# Patient Record
Sex: MALE | Race: ASIAN | HISPANIC OR LATINO | Employment: FULL TIME | URBAN - NONMETROPOLITAN AREA
[De-identification: names, ages, dates, MRNs, and addresses within clinical notes are randomized per-mention and may not be internally consistent; named-entity substitution may affect disease eponyms.]

---

## 2022-04-26 ENCOUNTER — OFFICE VISIT (OUTPATIENT)
Dept: FAMILY MEDICINE | Facility: CLINIC | Age: 27
End: 2022-04-26

## 2022-04-26 VITALS
WEIGHT: 155 LBS | OXYGEN SATURATION: 100 % | RESPIRATION RATE: 18 BRPM | HEART RATE: 61 BPM | DIASTOLIC BLOOD PRESSURE: 61 MMHG | HEIGHT: 65 IN | SYSTOLIC BLOOD PRESSURE: 103 MMHG | BODY MASS INDEX: 25.83 KG/M2 | TEMPERATURE: 98 F

## 2022-04-26 DIAGNOSIS — M54.9 BACK PAIN, UNSPECIFIED BACK LOCATION, UNSPECIFIED BACK PAIN LATERALITY, UNSPECIFIED CHRONICITY: Primary | ICD-10-CM

## 2022-04-26 DIAGNOSIS — K62.5 RECTAL BLEEDING: ICD-10-CM

## 2022-04-26 LAB
BASOPHILS # BLD AUTO: 0.06 K/UL (ref 0–0.2)
BASOPHILS NFR BLD AUTO: 0.7 % (ref 0–1)
BILIRUB SERPL-MCNC: NORMAL MG/DL
BLOOD URINE, POC: NORMAL
COLOR, POC UA: YELLOW
CRP SERPL-MCNC: 0.83 MG/DL (ref 0–0.8)
DIFFERENTIAL METHOD BLD: ABNORMAL
EOSINOPHIL # BLD AUTO: 0.18 K/UL (ref 0–0.5)
EOSINOPHIL NFR BLD AUTO: 2.2 % (ref 1–4)
ERYTHROCYTE [DISTWIDTH] IN BLOOD BY AUTOMATED COUNT: 13 % (ref 11.5–14.5)
ERYTHROCYTE [SEDIMENTATION RATE] IN BLOOD BY WESTERGREN METHOD: 16 MM/HR (ref 0–15)
GLUCOSE UR QL STRIP: NORMAL
HCT VFR BLD AUTO: 31.8 % (ref 40–54)
HGB BLD-MCNC: 11 G/DL (ref 13.5–18)
IMM GRANULOCYTES # BLD AUTO: 0.02 K/UL (ref 0–0.04)
IMM GRANULOCYTES NFR BLD: 0.2 % (ref 0–0.4)
KETONES UR QL STRIP: NORMAL
LEUKOCYTE ESTERASE URINE, POC: NORMAL
LYMPHOCYTES # BLD AUTO: 1.76 K/UL (ref 1–4.8)
LYMPHOCYTES NFR BLD AUTO: 21.9 % (ref 27–41)
MCH RBC QN AUTO: 31 PG (ref 27–31)
MCHC RBC AUTO-ENTMCNC: 34.6 G/DL (ref 32–36)
MCV RBC AUTO: 89.6 FL (ref 80–96)
MONOCYTES # BLD AUTO: 0.69 K/UL (ref 0–0.8)
MONOCYTES NFR BLD AUTO: 8.6 % (ref 2–6)
MPC BLD CALC-MCNC: 9.9 FL (ref 9.4–12.4)
NEUTROPHILS # BLD AUTO: 5.33 K/UL (ref 1.8–7.7)
NEUTROPHILS NFR BLD AUTO: 66.4 % (ref 53–65)
NITRITE, POC UA: NORMAL
NRBC # BLD AUTO: 0 X10E3/UL
NRBC, AUTO (.00): 0 %
PH, POC UA: 6
PLATELET # BLD AUTO: 285 K/UL (ref 150–400)
PROTEIN, POC: NORMAL
RBC # BLD AUTO: 3.55 M/UL (ref 4.6–6.2)
SPECIFIC GRAVITY, POC UA: >1.03
UROBILINOGEN, POC UA: 0.2
WBC # BLD AUTO: 8.04 K/UL (ref 4.5–11)

## 2022-04-26 PROCEDURE — 86140 C-REACTIVE PROTEIN: ICD-10-PCS | Mod: ,,, | Performed by: CLINICAL MEDICAL LABORATORY

## 2022-04-26 PROCEDURE — 99051 PR MEDICAL SERVICES, EVE/WKEND/HOLIDAY: ICD-10-PCS | Mod: ,,, | Performed by: NURSE PRACTITIONER

## 2022-04-26 PROCEDURE — 85025 CBC WITH DIFFERENTIAL: ICD-10-PCS | Mod: ,,, | Performed by: CLINICAL MEDICAL LABORATORY

## 2022-04-26 PROCEDURE — 99051 MED SERV EVE/WKEND/HOLIDAY: CPT | Mod: ,,, | Performed by: NURSE PRACTITIONER

## 2022-04-26 PROCEDURE — 99203 OFFICE O/P NEW LOW 30 MIN: CPT | Mod: ,,, | Performed by: NURSE PRACTITIONER

## 2022-04-26 PROCEDURE — 86140 C-REACTIVE PROTEIN: CPT | Mod: ,,, | Performed by: CLINICAL MEDICAL LABORATORY

## 2022-04-26 PROCEDURE — 85651 SEDIMENTATION RATE, AUTOMATED: ICD-10-PCS | Mod: ,,, | Performed by: CLINICAL MEDICAL LABORATORY

## 2022-04-26 PROCEDURE — 99203 PR OFFICE/OUTPT VISIT, NEW, LEVL III, 30-44 MIN: ICD-10-PCS | Mod: ,,, | Performed by: NURSE PRACTITIONER

## 2022-04-26 PROCEDURE — 85651 RBC SED RATE NONAUTOMATED: CPT | Mod: ,,, | Performed by: CLINICAL MEDICAL LABORATORY

## 2022-04-26 PROCEDURE — 81003 URINALYSIS AUTO W/O SCOPE: CPT | Mod: QW,,, | Performed by: NURSE PRACTITIONER

## 2022-04-26 PROCEDURE — 81003 POCT URINALYSIS W/O SCOPE: ICD-10-PCS | Mod: QW,,, | Performed by: NURSE PRACTITIONER

## 2022-04-26 PROCEDURE — 85025 COMPLETE CBC W/AUTO DIFF WBC: CPT | Mod: ,,, | Performed by: CLINICAL MEDICAL LABORATORY

## 2022-04-26 RX ORDER — CYCLOBENZAPRINE HCL 10 MG
10 TABLET ORAL 3 TIMES DAILY PRN
COMMUNITY

## 2022-04-26 RX ORDER — METHOCARBAMOL 750 MG/1
500 TABLET, FILM COATED ORAL 4 TIMES DAILY
COMMUNITY

## 2022-04-26 NOTE — PROGRESS NOTES
"   [facility]   [facility]      PATIENT NAME: Martha Fisher  : 1995  DATE: 22  MRN: [id number]      Patient PCP Information     Provider PCP Type    Primary Doctor No General          Reason for Visit / Chief Complaint: Back Pain (Patient complains of back pain and having blood in urine and stool. Room 5)       History of Present Illness / Problem Focused Workflow     Martha Fisher presents to the clinic with Back Pain (Patient complains of back pain and having blood in urine and stool. Room 5)     HPI   Patient has 6 mo hx of low back pain. Patient also report blood in stools. Bright red blood.   Mild abdominal pain. No nvd. No reported constipation or straining. In last 3 mo 4 lb weight loss.   No family hx of chrons or UC. No family hx of colon cancer.   Patient has been seen at outside clinics previously where he was given muscle relaxers x 2 for back pain and "suppository" for reported hemorrhoid.   Rectal bleeding continued despite use of suppositories    Review of Systems     Review of Systems   Constitutional: Negative for activity change, appetite change, chills, diaphoresis, fatigue, fever and unexpected weight change.   HENT: Negative for congestion, ear pain, facial swelling, hearing loss, nosebleeds and sore throat.    Respiratory: Negative for apnea, cough, shortness of breath and wheezing.    Cardiovascular: Negative for chest pain, palpitations and leg swelling.   Gastrointestinal: Positive for abdominal pain and blood in stool. Negative for abdominal distention, constipation, diarrhea and nausea.   Endocrine: Negative for cold intolerance, heat intolerance, polydipsia, polyphagia and polyuria.   Genitourinary: Negative for decreased urine volume, difficulty urinating, dysuria, flank pain, frequency, hematuria and urgency.   Musculoskeletal: Positive for back pain. Negative for arthralgias, joint swelling and myalgias.   Skin: Negative for color change and rash. " "  Neurological: Negative for dizziness, tremors, seizures, syncope, facial asymmetry, speech difficulty, weakness, light-headedness, numbness and headaches.   Hematological: Negative for adenopathy. Does not bruise/bleed easily.   Psychiatric/Behavioral: Negative for behavioral problems and confusion.       Medical / Social / Family History   History reviewed. No pertinent past medical history.    History reviewed. No pertinent surgical history.    Social History    reports that he has never smoked. He has never used smokeless tobacco. He reports that he does not drink alcohol and does not use drugs.    Family History  's family history includes Diabetes in his paternal grandmother.    Medications and Allergies     Medications  Outpatient Medications Marked as Taking for the 4/26/22 encounter (Office Visit) with CAMPOS Park   Medication Sig Dispense Refill    cyclobenzaprine (FLEXERIL) 10 MG tablet Take 10 mg by mouth 3 (three) times daily as needed for Muscle spasms.      methocarbamoL (ROBAXIN) 750 MG Tab Take 500 mg by mouth 4 (four) times daily.         Allergies  Review of patient's allergies indicates:  No Known Allergies    Physical Examination     Vitals:    04/26/22 1555   BP: 103/61   Pulse: 61   Resp: 18   Temp: 98 °F (36.7 °C)   SpO2: 100%   Weight: 70.3 kg (155 lb)   Height: 5' 5" (1.651 m)       Physical Exam  Vitals reviewed.   Constitutional:       Appearance: Normal appearance.   HENT:      Head: Normocephalic.      Right Ear: External ear normal.      Left Ear: External ear normal.      Nose: Nose normal.      Mouth/Throat:      Mouth: Mucous membranes are moist.   Eyes:      Extraocular Movements: Extraocular movements intact.   Cardiovascular:      Rate and Rhythm: Normal rate and regular rhythm.      Pulses: Normal pulses.      Heart sounds: Normal heart sounds.   Pulmonary:      Effort: Pulmonary effort is normal.      Breath sounds: Normal breath sounds.   Abdominal:      " General: There is no distension.      Palpations: There is no mass.      Tenderness: There is abdominal tenderness. There is no right CVA tenderness, left CVA tenderness, guarding or rebound.      Hernia: No hernia is present.      Comments: Bilateral upper and lower lower  Quad tenderness   Genitourinary:     Rectum: Guaiac result negative. No mass, tenderness, anal fissure or external hemorrhoid. Normal anal tone.   Musculoskeletal:         General: Normal range of motion.      Cervical back: Normal range of motion.   Skin:     General: Skin is warm and dry.      Capillary Refill: Capillary refill takes less than 2 seconds.   Neurological:      General: No focal deficit present.      Mental Status: He is alert and oriented to person, place, and time.   Psychiatric:         Mood and Affect: Mood normal.         Behavior: Behavior normal.         Thought Content: Thought content normal.         Judgment: Judgment normal.           Office Visit on 04/26/2022   Component Date Value Ref Range Status    Color, UA 04/26/2022 Yellow   Final    Spec Grav UA 04/26/2022 >1.030   Final    pH, UA 04/26/2022 6.0   Final    WBC, UA 04/26/2022 neg   Final    Nitrite, UA 04/26/2022 neg   Final    Protein, POC 04/26/2022 neg   Final    Glucose, UA 04/26/2022 neg   Final    Ketones, UA 04/26/2022 neg   Final    Bilirubin, POC 04/26/2022 neg   Final    Urobilinogen, UA 04/26/2022 0.2   Final    Blood, UA 04/26/2022 tr   Final    ESR Westergren 04/26/2022 16 (A) 0 - 15 mm/Hr Final    CRP 04/26/2022 0.83 (A) 0.00 - 0.80 mg/dL Final    WBC 04/26/2022 8.04  4.50 - 11.00 K/uL Final    RBC 04/26/2022 3.55 (A) 4.60 - 6.20 M/uL Final    Hemoglobin 04/26/2022 11.0 (A) 13.5 - 18.0 g/dL Final    Hematocrit 04/26/2022 31.8 (A) 40.0 - 54.0 % Final    MCV 04/26/2022 89.6  80.0 - 96.0 fL Final    MCH 04/26/2022 31.0  27.0 - 31.0 pg Final    MCHC 04/26/2022 34.6  32.0 - 36.0 g/dL Final    RDW 04/26/2022 13.0  11.5 - 14.5 % Final     Platelet Count 04/26/2022 285  150 - 400 K/uL Final    MPV 04/26/2022 9.9  9.4 - 12.4 fL Final    Neutrophils % 04/26/2022 66.4 (A) 53.0 - 65.0 % Final    Lymphocytes % 04/26/2022 21.9 (A) 27.0 - 41.0 % Final    Monocytes % 04/26/2022 8.6 (A) 2.0 - 6.0 % Final    Eosinophils % 04/26/2022 2.2  1.0 - 4.0 % Final    Basophils % 04/26/2022 0.7  0.0 - 1.0 % Final    Immature Granulocytes % 04/26/2022 0.2  0.0 - 0.4 % Final    nRBC, Auto 04/26/2022 0.0  <=0.0 % Final    Neutrophils, Abs 04/26/2022 5.33  1.80 - 7.70 K/uL Final    Lymphocytes, Absolute 04/26/2022 1.76  1.00 - 4.80 K/uL Final    Monocytes, Absolute 04/26/2022 0.69  0.00 - 0.80 K/uL Final    Eosinophils, Absolute 04/26/2022 0.18  0.00 - 0.50 K/uL Final    Basophils, Absolute 04/26/2022 0.06  0.00 - 0.20 K/uL Final    Immature Granulocytes, Absolute 04/26/2022 0.02  0.00 - 0.04 K/uL Final    nRBC, Absolute 04/26/2022 0.00  <=0.00 x10e3/uL Final    Diff Type 04/26/2022 Auto   Final             Assessment and Plan (including Health Maintenance)   :    Plan:   Back pain, unspecified back location, unspecified back pain laterality, unspecified chronicity  -     POCT URINALYSIS W/O SCOPE negative few rbc    Rectal bleeding  -     CBC Auto Differential; Future; Expected date: 04/26/2022  -     Sedimentation Rate; Future; Expected date: 04/26/2022  -     C-Reactive Protein; Future; Expected date: 04/26/2022  -     Calprotectin, Stool; Future; Expected date: 04/26/2022  -     Ova and Parasite Exam; Future; Expected date: 04/26/2022  -     Enteric Pathogen Panel; Future; Expected date: 04/26/2022  -     Occult blood x 1, stool; Future; Expected date: 04/26/2022  RTC in 2 weeks to review results. May need gi referral depending on results and if bleeding continues      There are no Patient Instructions on file for this visit.       Health Maintenance Due   Topic Date Due    Hepatitis C Screening  Never done    Lipid Panel  Never done    COVID-19  Vaccine (1) Never done    HPV Vaccines (1 - Male 2-dose series) Never done    HIV Screening  Never done    TETANUS VACCINE  Never done    Influenza Vaccine (1) Never done         There is no immunization history on file for this patient.     Problem List Items Addressed This Visit    None     Visit Diagnoses     Back pain, unspecified back location, unspecified back pain laterality, unspecified chronicity    -  Primary    Relevant Orders    POCT URINALYSIS W/O SCOPE (Completed)    Rectal bleeding        Relevant Orders    CBC Auto Differential (Completed)    Sedimentation Rate (Completed)    C-Reactive Protein (Completed)    Calprotectin, Stool    Ova and Parasite Exam    Enteric Pathogen Panel    Occult blood x 1, stool          The patient has no Health Maintenance topics of status Not Due    Future Appointments   Date Time Provider Department Center   5/10/2022  4:30 PM [facility]            Signature:  C[facility] Park [facility]     Date of encounter: 4/26/22

## 2022-04-27 LAB — OCCULT BLOOD: NEGATIVE

## 2022-04-27 PROCEDURE — 87177 OVA AND PARASITE EXAM: ICD-10-PCS | Mod: ,,, | Performed by: CLINICAL MEDICAL LABORATORY

## 2022-04-27 PROCEDURE — 83993 CALPROTECTIN, STOOL: ICD-10-PCS | Mod: 90,,, | Performed by: CLINICAL MEDICAL LABORATORY

## 2022-04-27 PROCEDURE — 87209 OVA AND PARASITE EXAM: ICD-10-PCS | Mod: ,,, | Performed by: CLINICAL MEDICAL LABORATORY

## 2022-04-27 PROCEDURE — 87177 OVA AND PARASITES SMEARS: CPT | Mod: ,,, | Performed by: CLINICAL MEDICAL LABORATORY

## 2022-04-27 PROCEDURE — 83993 ASSAY FOR CALPROTECTIN FECAL: CPT | Mod: 90,,, | Performed by: CLINICAL MEDICAL LABORATORY

## 2022-04-27 PROCEDURE — 87506 IADNA-DNA/RNA PROBE TQ 6-11: CPT | Mod: ,,, | Performed by: CLINICAL MEDICAL LABORATORY

## 2022-04-27 PROCEDURE — 87506 ENTERIC PATHOGEN PANEL: ICD-10-PCS | Mod: ,,, | Performed by: CLINICAL MEDICAL LABORATORY

## 2022-04-27 PROCEDURE — 87209 SMEAR COMPLEX STAIN: CPT | Mod: ,,, | Performed by: CLINICAL MEDICAL LABORATORY

## 2022-04-28 LAB
C COLI+JEJ+UPSA DNA STL QL NAA+NON-PROBE: NEGATIVE
E COLI SXT1 STL QL IA: NEGATIVE
E COLI SXT2 STL QL IA: NEGATIVE
NOROVIRUS GI+II RNA STL QL NAA+NON-PROBE: NEGATIVE
RVA RNA STL QL NAA+NON-PROBE: NEGATIVE
S ENT+BONG DNA STL QL NAA+NON-PROBE: NEGATIVE
SHIGELLA SPECIES NAT: NEGATIVE
V CHOL+PARA+VUL DNA STL QL NAA+NON-PROBE: NEGATIVE
Y ENTEROCOL DNA STL QL NAA+NON-PROBE: NEGATIVE

## 2022-04-29 LAB
O+P STL CONC: NORMAL
TRICHROME SMEAR: NORMAL

## 2022-05-03 LAB — CALPROTECTIN STL-MCNT: 113 MCG/G

## 2022-05-06 DIAGNOSIS — R10.9 ABDOMINAL PAIN, UNSPECIFIED ABDOMINAL LOCATION: Primary | ICD-10-CM

## 2022-05-06 DIAGNOSIS — K62.5 RECTAL BLEEDING: ICD-10-CM

## 2022-05-10 ENCOUNTER — OFFICE VISIT (OUTPATIENT)
Dept: FAMILY MEDICINE | Facility: CLINIC | Age: 27
End: 2022-05-10

## 2022-05-10 VITALS
HEIGHT: 65 IN | HEART RATE: 62 BPM | WEIGHT: 152.25 LBS | TEMPERATURE: 99 F | OXYGEN SATURATION: 99 % | BODY MASS INDEX: 25.37 KG/M2 | SYSTOLIC BLOOD PRESSURE: 106 MMHG | RESPIRATION RATE: 18 BRPM | DIASTOLIC BLOOD PRESSURE: 63 MMHG

## 2022-05-10 DIAGNOSIS — D64.9 ANEMIA, UNSPECIFIED TYPE: ICD-10-CM

## 2022-05-10 DIAGNOSIS — K62.5 RECTAL BLEEDING: Primary | ICD-10-CM

## 2022-05-10 DIAGNOSIS — K52.9 COLITIS: ICD-10-CM

## 2022-05-10 PROCEDURE — 99212 OFFICE O/P EST SF 10 MIN: CPT | Mod: ,,, | Performed by: NURSE PRACTITIONER

## 2022-05-10 PROCEDURE — 99212 PR OFFICE/OUTPT VISIT, EST, LEVL II, 10-19 MIN: ICD-10-PCS | Mod: ,,, | Performed by: NURSE PRACTITIONER

## 2022-05-10 NOTE — PROGRESS NOTES
CAMP[facility] Park   [facility]      PATIENT NAME: Martha Fisher  : 1995  DATE: 5/10/22  MRN: [id number]      Patient PCP Information     Provider PCP Type    Primary Doctor No General          Reason for Visit / Chief Complaint: Rectal Bleeding (Stated it started 7 months ago /Room 8) and Back Pain       History of Present Illness / Problem Focused Workflow     Martha Fisher presents to the clinic with Rectal Bleeding (Stated it started 7 months ago /Room 8) and Back Pain     HPI   Mr Fisher is here for follow up and review of labs. Friend with him. Reviewed labs results. Concerned for inflammatory bowel process contributing to pain and bleeding as ESR, CRP and calprotectin elevated. Cx and ova parasite negative.   Gi referral placed. Patient has not heard from GI.   Will call Dr Segura office tomorrow when open to discuss need for scope sooner than later.     Review of Systems     Review of Systems   Constitutional: Positive for unexpected weight change. Negative for activity change, appetite change, chills, diaphoresis, fatigue and fever.   HENT: Negative for congestion, ear pain, facial swelling, hearing loss, nosebleeds and sore throat.    Respiratory: Negative for apnea, cough, shortness of breath and wheezing.    Cardiovascular: Negative for chest pain, palpitations and leg swelling.   Gastrointestinal: Positive for abdominal pain and blood in stool. Negative for abdominal distention, constipation, diarrhea and nausea.   Endocrine: Negative for cold intolerance, heat intolerance, polydipsia, polyphagia and polyuria.   Genitourinary: Negative for decreased urine volume, difficulty urinating, dysuria, flank pain, frequency, hematuria and urgency.   Musculoskeletal: Negative for arthralgias, joint swelling and myalgias.   Skin: Negative for color change and rash.   Neurological: Negative for dizziness, tremors, seizures, syncope, facial asymmetry, speech difficulty, weakness,  "light-headedness, numbness and headaches.   Hematological: Negative for adenopathy. Does not bruise/bleed easily.   Psychiatric/Behavioral: Negative for behavioral problems and confusion.       Medical / Social / Family History   History reviewed. No pertinent past medical history.    History reviewed. No pertinent surgical history.    Social History    reports that he has never smoked. He has never used smokeless tobacco. He reports that he does not drink alcohol and does not use drugs.    Family History  's family history includes Diabetes in his paternal grandmother.    Medications and Allergies     Medications  No outpatient medications have been marked as taking for the 5/10/22 encounter (Office Visit) with CAMPOS Park.       Allergies  Review of patient's allergies indicates:  No Known Allergies    Physical Examination     Vitals:    05/10/22 1642   BP: 106/63   BP Location: Right arm   Patient Position: Sitting   Pulse: 62   Resp: 18   Temp: 98.6 °F (37 °C)   SpO2: 99%   Weight: 69.1 kg (152 lb 4 oz)   Height: 5' 5" (1.651 m)       Physical Exam  Vitals and nursing note reviewed.   Constitutional:       Appearance: Normal appearance. He is normal weight.   HENT:      Head: Normocephalic.      Right Ear: External ear normal.      Left Ear: External ear normal.      Nose: Nose normal.      Mouth/Throat:      Mouth: Mucous membranes are moist.   Eyes:      Extraocular Movements: Extraocular movements intact.      Conjunctiva/sclera: Conjunctivae normal.      Pupils: Pupils are equal, round, and reactive to light.   Cardiovascular:      Pulses: Normal pulses.      Heart sounds: Normal heart sounds. No murmur heard.  Pulmonary:      Effort: Pulmonary effort is normal.      Breath sounds: Normal breath sounds. No stridor. No wheezing or rhonchi.   Abdominal:      General: Bowel sounds are normal. There is no distension.      Palpations: Abdomen is soft. There is no mass.      Tenderness: There is " abdominal tenderness.      Comments: Left and mid upper abdominal tenderness   Musculoskeletal:         General: No swelling or tenderness. Normal range of motion.      Cervical back: Normal range of motion and neck supple.      Right lower leg: No edema.      Left lower leg: No edema.   Skin:     General: Skin is warm and dry.      Capillary Refill: Capillary refill takes less than 2 seconds.   Neurological:      General: No focal deficit present.      Mental Status: He is alert. Mental status is at baseline.      Cranial Nerves: No cranial nerve deficit.      Sensory: No sensory deficit.      Motor: No weakness.   Psychiatric:         Mood and Affect: Mood normal.         Behavior: Behavior normal.         Thought Content: Thought content normal.         Judgment: Judgment normal.           No results found for any previous visit.             Assessment and Plan (including Health Maintenance)       Plan:   Rectal bleeding    Colitis    Anemia, unspecified type     Gi referral prev placed, Still pending  Will follow up with GI tomorrow to expedite process for eval and scope  Patient weight down a couple more lbs. Continued rectal bleeding waxing and waning in severity   1 mo follow up scheduled   There are no Patient Instructions on file for this visit.       Health Maintenance Due   Topic Date Due    Hepatitis C Screening  Never done    Lipid Panel  Never done    COVID-19 Vaccine (1) Never done    HPV Vaccines (1 - Male 2-dose series) Never done    HIV Screening  Never done    TETANUS VACCINE  Never done         There is no immunization history on file for this patient.     Problem List Items Addressed This Visit    None     Visit Diagnoses     Rectal bleeding    -  Primary    Colitis        Anemia, unspecified type              Health Maintenance Topics with due status: Not Due       Topic Last Completion Date    Influenza Vaccine Not Due       Future Appointments   Date Time Provider Department Center    6/10/2022  8[facility]30 [facility]            Signature:  [facility]  [facility]     Date of encounter: 5/10/22

## 2022-05-11 DIAGNOSIS — Z11.52 ENCOUNTER FOR SCREENING FOR SEVERE ACUTE RESPIRATORY SYNDROME CORONAVIRUS 2 (SARS-COV-2) INFECTION: Primary | ICD-10-CM

## 2022-05-11 DIAGNOSIS — K62.5 RECTAL BLEED: Primary | ICD-10-CM

## 2022-05-11 DIAGNOSIS — D64.9 ANEMIA: ICD-10-CM

## 2022-06-14 ENCOUNTER — ANESTHESIA EVENT (OUTPATIENT)
Dept: GASTROENTEROLOGY | Facility: HOSPITAL | Age: 27
End: 2022-06-14

## 2022-06-14 ENCOUNTER — ANESTHESIA (OUTPATIENT)
Dept: GASTROENTEROLOGY | Facility: HOSPITAL | Age: 27
End: 2022-06-14

## 2022-06-14 ENCOUNTER — HOSPITAL ENCOUNTER (OUTPATIENT)
Dept: GASTROENTEROLOGY | Facility: HOSPITAL | Age: 27
Discharge: HOME | End: 2022-06-14
Attending: STUDENT IN AN ORGANIZED HEALTH CARE EDUCATION/TRAINING PROGRAM

## 2022-06-14 VITALS
DIASTOLIC BLOOD PRESSURE: 63 MMHG | TEMPERATURE: 98 F | RESPIRATION RATE: 17 BRPM | WEIGHT: 150 LBS | BODY MASS INDEX: 24.99 KG/M2 | HEART RATE: 54 BPM | HEIGHT: 65 IN | SYSTOLIC BLOOD PRESSURE: 114 MMHG | OXYGEN SATURATION: 100 %

## 2022-06-14 DIAGNOSIS — K62.5 RECTAL BLEED: ICD-10-CM

## 2022-06-14 DIAGNOSIS — D64.9 ANEMIA: ICD-10-CM

## 2022-06-14 PROCEDURE — 88342 SURGICAL PATHOLOGY: ICD-10-PCS | Mod: 26,,, | Performed by: PATHOLOGY

## 2022-06-14 PROCEDURE — D9220A PRA ANESTHESIA: ICD-10-PCS | Mod: ,,,

## 2022-06-14 PROCEDURE — 88305 TISSUE EXAM BY PATHOLOGIST: CPT | Mod: 26,XU,, | Performed by: PATHOLOGY

## 2022-06-14 PROCEDURE — 88305 TISSUE EXAM BY PATHOLOGIST: CPT | Mod: SUR | Performed by: STUDENT IN AN ORGANIZED HEALTH CARE EDUCATION/TRAINING PROGRAM

## 2022-06-14 PROCEDURE — 88342 IMHCHEM/IMCYTCHM 1ST ANTB: CPT | Mod: 26,,, | Performed by: PATHOLOGY

## 2022-06-14 PROCEDURE — 45380 PR COLONOSCOPY,BIOPSY: ICD-10-PCS | Mod: ,,, | Performed by: STUDENT IN AN ORGANIZED HEALTH CARE EDUCATION/TRAINING PROGRAM

## 2022-06-14 PROCEDURE — 27201423 OPTIME MED/SURG SUP & DEVICES STERILE SUPPLY

## 2022-06-14 PROCEDURE — D9220A PRA ANESTHESIA: Mod: ,,,

## 2022-06-14 PROCEDURE — 45380 COLONOSCOPY AND BIOPSY: CPT

## 2022-06-14 PROCEDURE — 25000003 PHARM REV CODE 250

## 2022-06-14 PROCEDURE — 37000009 HC ANESTHESIA EA ADD 15 MINS

## 2022-06-14 PROCEDURE — 63600175 PHARM REV CODE 636 W HCPCS

## 2022-06-14 PROCEDURE — 88341 IMHCHEM/IMCYTCHM EA ADD ANTB: CPT | Mod: 26,,, | Performed by: PATHOLOGY

## 2022-06-14 PROCEDURE — 88341 SURGICAL PATHOLOGY: ICD-10-PCS | Mod: 26,,, | Performed by: PATHOLOGY

## 2022-06-14 PROCEDURE — C1889 IMPLANT/INSERT DEVICE, NOC: HCPCS

## 2022-06-14 PROCEDURE — 88305 SURGICAL PATHOLOGY: ICD-10-PCS | Mod: 26,XU,, | Performed by: PATHOLOGY

## 2022-06-14 PROCEDURE — 45380 COLONOSCOPY AND BIOPSY: CPT | Mod: ,,, | Performed by: STUDENT IN AN ORGANIZED HEALTH CARE EDUCATION/TRAINING PROGRAM

## 2022-06-14 PROCEDURE — 37000008 HC ANESTHESIA 1ST 15 MINUTES

## 2022-06-14 RX ORDER — ONDANSETRON 2 MG/ML
4 INJECTION INTRAMUSCULAR; INTRAVENOUS ONCE AS NEEDED
Status: DISCONTINUED | OUTPATIENT
Start: 2022-06-14 | End: 2022-06-15 | Stop reason: HOSPADM

## 2022-06-14 RX ORDER — SODIUM CHLORIDE 0.9 % (FLUSH) 0.9 %
10 SYRINGE (ML) INJECTION
Status: CANCELLED | OUTPATIENT
Start: 2022-06-14

## 2022-06-14 RX ORDER — LIDOCAINE HYDROCHLORIDE 20 MG/ML
INJECTION, SOLUTION EPIDURAL; INFILTRATION; INTRACAUDAL; PERINEURAL
Status: DISCONTINUED | OUTPATIENT
Start: 2022-06-14 | End: 2022-06-14

## 2022-06-14 RX ORDER — SODIUM CHLORIDE 9 MG/ML
INJECTION, SOLUTION INTRAVENOUS CONTINUOUS
Status: CANCELLED | OUTPATIENT
Start: 2022-06-14

## 2022-06-14 RX ORDER — SODIUM CHLORIDE 9 MG/ML
INJECTION, SOLUTION INTRAVENOUS CONTINUOUS PRN
Status: DISCONTINUED | OUTPATIENT
Start: 2022-06-14 | End: 2022-06-14

## 2022-06-14 RX ORDER — PROCHLORPERAZINE EDISYLATE 5 MG/ML
5 INJECTION INTRAMUSCULAR; INTRAVENOUS ONCE AS NEEDED
Status: DISCONTINUED | OUTPATIENT
Start: 2022-06-14 | End: 2022-06-15 | Stop reason: HOSPADM

## 2022-06-14 RX ORDER — PROPOFOL 10 MG/ML
VIAL (ML) INTRAVENOUS
Status: DISCONTINUED | OUTPATIENT
Start: 2022-06-14 | End: 2022-06-14

## 2022-06-14 RX ADMIN — PROPOFOL 40 MG: 10 INJECTION, EMULSION INTRAVENOUS at 08:06

## 2022-06-14 RX ADMIN — SODIUM CHLORIDE: 9 INJECTION, SOLUTION INTRAVENOUS at 08:06

## 2022-06-14 RX ADMIN — LIDOCAINE HYDROCHLORIDE 60 MG: 20 INJECTION, SOLUTION EPIDURAL; INFILTRATION; INTRACAUDAL; PERINEURAL at 08:06

## 2022-06-14 RX ADMIN — PROPOFOL 80 MG: 10 INJECTION, EMULSION INTRAVENOUS at 08:06

## 2022-06-14 NOTE — H&P
History of Present Illness    Martha Fisher is a 26 y.o. male that  has no past medical history on file.     Labs show Hgb 11. FOBT negative.  Elevated ESR and CRP.    Describes rectal bleeding for several months.   Denies any constipation.  Also describes back pain, which he takes muscle relaxers, but denies any prior imaging.\  Denies NSAID use.    Patient otherwise denies any:  - black stools  - nausea  - vomiting  - diarrhea  - abdominal pain  - family history of GI related malignancies    ROS  - 12 point review of systems is negative except as otherwise stated in HPI.    No past medical history on file.    No past surgical history on file.    Family History   Problem Relation Age of Onset    Diabetes Paternal Grandmother        Social History     Socioeconomic History    Marital status:    Tobacco Use    Smoking status: Never Smoker    Smokeless tobacco: Never Used   Substance and Sexual Activity    Alcohol use: Never    Drug use: Never    Sexual activity: Not Currently   Social History Narrative    Lives at home with many workers     No smoking inside home. No pets inside home        Current Outpatient Medications   Medication Sig Dispense Refill    cyclobenzaprine (FLEXERIL) 10 MG tablet Take 10 mg by mouth 3 (three) times daily as needed for Muscle spasms.      methocarbamoL (ROBAXIN) 750 MG Tab Take 500 mg by mouth 4 (four) times daily.       No current facility-administered medications for this encounter.       Review of patient's allergies indicates:  No Known Allergies    Objective:  There were no vitals filed for this visit.     Constitutional:       General: no acute distress.     Appearance: Normal appearance. Non toxic-appearing.   HENT:      Head: Normocephalic and atraumatic.      Mouth/Throat:      Pharynx: Oropharynx is clear. No posterior oropharyngeal erythema.   Eyes:      General: No scleral icterus.     Conjunctiva/sclera: Conjunctivae normal.   Cardiovascular:      Rate and  Rhythm: Normal rate and regular rhythm.      Heart sounds: Normal heart sounds.   Pulmonary:      Effort: Pulmonary effort is normal.      Breath sounds: Normal breath sounds.   Abdominal:      General: Abdomen is flat. There is no distension.      Palpations: Abdomen is soft. There is no mass.      Tenderness: There is no abdominal tenderness. There is no guarding.   Musculoskeletal:         General: No swelling or deformity.      Cervical back: Normal range of motion and neck supple.   Skin:     General: Skin is warm and dry.   Neurological:      General: No focal deficit present.      Mental Status: alert and oriented to person, place, and time.     Assessment and Plan:  Rectal Bleeding  - will plan for colonoscopy for evaluation    John Segura MD  Gastroenterology

## 2022-06-14 NOTE — ANESTHESIA POSTPROCEDURE EVALUATION
Anesthesia Post Evaluation    Patient: Martha Fisher    Procedure(s) Performed: colonoscopy    Final Anesthesia Type: general      Patient location during evaluation: GI PACU  Patient participation: Yes- Able to Participate  Level of consciousness: awake and alert  Post-procedure vital signs: reviewed and stable  Pain management: adequate  Airway patency: patent    PONV status at discharge: No PONV  Anesthetic complications: no      Cardiovascular status: blood pressure returned to baseline  Respiratory status: unassisted and spontaneous ventilation  Hydration status: euvolemic  Follow-up not needed.          Vitals Value Taken Time   BP 96/54 06/14/22 0904   Temp 36.6 °C (97.9 °F) 06/14/22 0847   Pulse 51 06/14/22 0906   Resp 15 06/14/22 0906   SpO2 100 % 06/14/22 0906   Vitals shown include unvalidated device data.      No case tracking events are documented in the log.      Pain/Martin Score: Martin Score: 8 (6/14/2022  8:50 AM)

## 2022-06-14 NOTE — TRANSFER OF CARE
"Anesthesia Transfer of Care Note    Patient: Martha Fisher    Procedure(s) Performed: colonoscopy    Patient location: GI    Anesthesia Type: general and MAC    Transport from OR: Transported from OR on room air with adequate spontaneous ventilation    Post pain: adequate analgesia    Post assessment: no apparent anesthetic complications    Post vital signs: stable    Level of consciousness: responds to stimulation    Nausea/Vomiting: no nausea/vomiting    Complications: none    Transfer of care protocol was followed      Last vitals:   Visit Vitals  BP (!) 95/58   Pulse 60   Temp 36.6 °C (97.9 °F)   Resp 14   Ht 5' 5" (1.651 m)   Wt 68 kg (150 lb)   SpO2 100%   BMI 24.96 kg/m²     "

## 2022-06-14 NOTE — DISCHARGE INSTRUCTIONS
Procedure Date  6/14/22     Impression  Overall Impression: Erythema, ulceration and edema in the distal 20cm of the rectum. Biopsied. Normal rest of colon and TI, also biopsied. Suspect ulcerative colitis.      Recommendation  Await pathology results  Recommend obtaining health insurance as well as followup in clinic in 1-2 weeks for evaluation and treatment of possible US.  Stop NSAIDs.  NO DRIVING, OPERATING EQUIPMENT, OR SIGNING LEGAL DOCUMENTS FOR 24 HOURS.   THE NURSE WILL CALL YOU WITH YOUR BIOPSY RESULTS IN A FEW DAYS.

## 2022-06-14 NOTE — ANESTHESIA PREPROCEDURE EVALUATION
06/14/2022  Martha Fisher is a 26 y.o., male.      Pre-op Assessment    I have reviewed the Patient Summary Reports.     I have reviewed the Nursing Notes. I have reviewed the NPO Status.   I have reviewed the Medications.     Review of Systems  Anesthesia Hx:  No problems with previous Anesthesia    Social:  Non-Smoker, No Alcohol Use    Hematology/Oncology:  Hematology Normal   Oncology Normal     EENT/Dental:EENT/Dental Normal   Cardiovascular:  Cardiovascular Normal     Pulmonary:  Pulmonary Normal    Renal/:  Renal/ Normal     Hepatic/GI:  Hepatic/GI Normal    Musculoskeletal:  Musculoskeletal Normal    Neurological:  Neurology Normal    Endocrine:  Endocrine Normal    Dermatological:  Skin Normal    Psych:  Psychiatric Normal           Physical Exam  General: Well nourished, Cooperative, Alert and Oriented    Airway:  Mallampati: II   Mouth Opening: Normal  TM Distance: Normal  Tongue: Normal  Neck ROM: Normal ROM    Dental:  Intact    Chest/Lungs:  Clear to auscultation, Normal Respiratory Rate    Heart:  Rate: Normal  Rhythm: Regular Rhythm  Sounds: Normal    Abdomen:  Normal, Soft, Nontender        Anesthesia Plan  Type of Anesthesia, risks & benefits discussed:    Anesthesia Type: Gen Natural Airway, MAC  Intra-op Monitoring Plan: Standard ASA Monitors  Post Op Pain Control Plan: multimodal analgesia and IV/PO Opioids PRN  Induction:  IV  Informed Consent: Informed consent signed with the Patient and all parties understand the risks and agree with anesthesia plan.  All questions answered.   ASA Score: 1  Day of Surgery Review of History & Physical: I have interviewed and examined the patient. I have reviewed the patient's H&P dated:     Ready For Surgery From Anesthesia Perspective.     .

## 2022-06-15 LAB
DHEA SERPL-MCNC: NORMAL
ESTROGEN SERPL-MCNC: NORMAL PG/ML
INSULIN SERPL-ACNC: NORMAL U[IU]/ML
LAB AP GROSS DESCRIPTION: NORMAL
LAB AP LABORATORY NOTES: NORMAL
T3RU NFR SERPL: NORMAL %

## 2022-06-16 DIAGNOSIS — K63.89 COLONIC MASS: Primary | ICD-10-CM

## 2022-06-21 ENCOUNTER — HOSPITAL ENCOUNTER (OUTPATIENT)
Dept: RADIOLOGY | Facility: HOSPITAL | Age: 27
Discharge: HOME | End: 2022-06-21
Attending: STUDENT IN AN ORGANIZED HEALTH CARE EDUCATION/TRAINING PROGRAM

## 2022-06-21 DIAGNOSIS — K63.89 COLONIC MASS: ICD-10-CM

## 2022-06-21 PROCEDURE — 71260 CT CHEST ABDOMEN PELVIS W W/O CONTRAST (XPD): ICD-10-PCS | Mod: 26,,, | Performed by: RADIOLOGY

## 2022-06-21 PROCEDURE — 74178 CT ABD&PLV WO CNTR FLWD CNTR: CPT | Mod: 26,,, | Performed by: RADIOLOGY

## 2022-06-21 PROCEDURE — 74178 CT ABD&PLV WO CNTR FLWD CNTR: CPT | Mod: TC

## 2022-06-21 PROCEDURE — 71260 CT THORAX DX C+: CPT | Mod: 26,,, | Performed by: RADIOLOGY

## 2022-06-21 PROCEDURE — 74178 CT CHEST ABDOMEN PELVIS W W/O CONTRAST (XPD): ICD-10-PCS | Mod: 26,,, | Performed by: RADIOLOGY

## 2022-06-21 PROCEDURE — 25500020 PHARM REV CODE 255: Performed by: STUDENT IN AN ORGANIZED HEALTH CARE EDUCATION/TRAINING PROGRAM

## 2022-06-21 RX ADMIN — IOPAMIDOL 100 ML: 755 INJECTION, SOLUTION INTRAVENOUS at 11:06
